# Patient Record
Sex: FEMALE | Race: WHITE | NOT HISPANIC OR LATINO | Employment: OTHER | URBAN - METROPOLITAN AREA
[De-identification: names, ages, dates, MRNs, and addresses within clinical notes are randomized per-mention and may not be internally consistent; named-entity substitution may affect disease eponyms.]

---

## 2017-08-21 ENCOUNTER — CONVERSION ENCOUNTER (OUTPATIENT)
Dept: GENERAL RADIOLOGY | Facility: HOSPITAL | Age: 67
End: 2017-08-21

## 2018-03-20 ENCOUNTER — OFFICE VISIT CONVERTED (OUTPATIENT)
Dept: FAMILY MEDICINE CLINIC | Facility: CLINIC | Age: 68
End: 2018-03-20
Attending: NURSE PRACTITIONER

## 2018-07-26 ENCOUNTER — OFFICE VISIT CONVERTED (OUTPATIENT)
Dept: FAMILY MEDICINE CLINIC | Facility: CLINIC | Age: 68
End: 2018-07-26
Attending: NURSE PRACTITIONER

## 2018-07-26 ENCOUNTER — CONVERSION ENCOUNTER (OUTPATIENT)
Dept: FAMILY MEDICINE CLINIC | Facility: CLINIC | Age: 68
End: 2018-07-26

## 2018-09-18 ENCOUNTER — CONVERSION ENCOUNTER (OUTPATIENT)
Dept: GENERAL RADIOLOGY | Facility: HOSPITAL | Age: 68
End: 2018-09-18

## 2019-03-15 ENCOUNTER — OFFICE VISIT CONVERTED (OUTPATIENT)
Dept: FAMILY MEDICINE CLINIC | Facility: CLINIC | Age: 69
End: 2019-03-15
Attending: NURSE PRACTITIONER

## 2019-03-15 ENCOUNTER — CONVERSION ENCOUNTER (OUTPATIENT)
Dept: FAMILY MEDICINE CLINIC | Facility: CLINIC | Age: 69
End: 2019-03-15

## 2019-03-27 ENCOUNTER — HOSPITAL ENCOUNTER (OUTPATIENT)
Dept: GENERAL RADIOLOGY | Facility: HOSPITAL | Age: 69
Discharge: HOME | End: 2019-03-27
Attending: NURSE PRACTITIONER

## 2019-04-16 ENCOUNTER — OFFICE VISIT CONVERTED (OUTPATIENT)
Dept: SURGERY | Facility: CLINIC | Age: 69
End: 2019-04-16
Attending: NURSE PRACTITIONER

## 2019-08-30 ENCOUNTER — OFFICE VISIT CONVERTED (OUTPATIENT)
Dept: FAMILY MEDICINE CLINIC | Facility: CLINIC | Age: 69
End: 2019-08-30
Attending: NURSE PRACTITIONER

## 2019-09-17 ENCOUNTER — HOSPITAL ENCOUNTER (OUTPATIENT)
Dept: LAB | Facility: HOSPITAL | Age: 69
Discharge: HOME | End: 2019-09-17
Attending: NURSE PRACTITIONER

## 2019-09-17 LAB
ALBUMIN SERPL-MCNC: 4.5 G/DL (ref 3.5–5)
ALBUMIN/GLOB SERPL: 1.7 {RATIO} (ref 1.4–2.6)
ALP SERPL-CCNC: 105 U/L (ref 43–160)
ALT SERPL-CCNC: 14 U/L (ref 10–40)
ANION GAP SERPL CALC-SCNC: 21 MMOL/L (ref 8–19)
AST SERPL-CCNC: 17 U/L (ref 15–50)
BASOPHILS # BLD AUTO: 0.04 10*3/UL (ref 0–0.2)
BASOPHILS NFR BLD AUTO: 0.6 % (ref 0–3)
BILIRUB SERPL-MCNC: 0.66 MG/DL (ref 0.2–1.3)
BUN SERPL-MCNC: 15 MG/DL (ref 5–25)
BUN/CREAT SERPL: 15 {RATIO} (ref 6–20)
CALCIUM SERPL-MCNC: 9.3 MG/DL (ref 8.7–10.4)
CHLORIDE SERPL-SCNC: 105 MMOL/L (ref 99–111)
CHOLEST SERPL-MCNC: 204 MG/DL (ref 107–200)
CHOLEST/HDLC SERPL: 3.6 {RATIO} (ref 3–6)
CONV ABS IMM GRAN: 0.02 10*3/UL (ref 0–0.2)
CONV CO2: 22 MMOL/L (ref 22–32)
CONV IMMATURE GRAN: 0.3 % (ref 0–1.8)
CONV TOTAL PROTEIN: 7.1 G/DL (ref 6.3–8.2)
CREAT UR-MCNC: 0.99 MG/DL (ref 0.5–0.9)
DEPRECATED RDW RBC AUTO: 44.3 FL (ref 36.4–46.3)
EOSINOPHIL # BLD AUTO: 0.15 10*3/UL (ref 0–0.7)
EOSINOPHIL # BLD AUTO: 2.1 % (ref 0–7)
ERYTHROCYTE [DISTWIDTH] IN BLOOD BY AUTOMATED COUNT: 12.3 % (ref 11.7–14.4)
GFR SERPLBLD BASED ON 1.73 SQ M-ARVRAT: 57 ML/MIN/{1.73_M2}
GLOBULIN UR ELPH-MCNC: 2.6 G/DL (ref 2–3.5)
GLUCOSE SERPL-MCNC: 121 MG/DL (ref 65–99)
HCT VFR BLD AUTO: 43.4 % (ref 37–47)
HDLC SERPL-MCNC: 57 MG/DL (ref 40–60)
HGB BLD-MCNC: 14.3 G/DL (ref 12–16)
LDLC SERPL CALC-MCNC: 114 MG/DL (ref 70–100)
LYMPHOCYTES # BLD AUTO: 2.59 10*3/UL (ref 1–5)
LYMPHOCYTES NFR BLD AUTO: 36.3 % (ref 20–45)
MCH RBC QN AUTO: 31.8 PG (ref 27–31)
MCHC RBC AUTO-ENTMCNC: 32.9 G/DL (ref 33–37)
MCV RBC AUTO: 96.7 FL (ref 81–99)
MONOCYTES # BLD AUTO: 0.56 10*3/UL (ref 0.2–1.2)
MONOCYTES NFR BLD AUTO: 7.9 % (ref 3–10)
NEUTROPHILS # BLD AUTO: 3.77 10*3/UL (ref 2–8)
NEUTROPHILS NFR BLD AUTO: 52.8 % (ref 30–85)
NRBC CBCN: 0 % (ref 0–0.7)
OSMOLALITY SERPL CALC.SUM OF ELEC: 300 MOSM/KG (ref 273–304)
PLATELET # BLD AUTO: 241 10*3/UL (ref 130–400)
PMV BLD AUTO: 11.7 FL (ref 9.4–12.3)
POTASSIUM SERPL-SCNC: 4.1 MMOL/L (ref 3.5–5.3)
RBC # BLD AUTO: 4.49 10*6/UL (ref 4.2–5.4)
SODIUM SERPL-SCNC: 144 MMOL/L (ref 135–147)
T4 FREE SERPL-MCNC: 1.1 NG/DL (ref 0.9–1.8)
TRIGL SERPL-MCNC: 164 MG/DL (ref 40–150)
TSH SERPL-ACNC: 2.91 M[IU]/L (ref 0.27–4.2)
VLDLC SERPL-MCNC: 33 MG/DL (ref 5–37)
WBC # BLD AUTO: 7.13 10*3/UL (ref 4.8–10.8)

## 2019-10-02 ENCOUNTER — HOSPITAL ENCOUNTER (OUTPATIENT)
Dept: GENERAL RADIOLOGY | Facility: HOSPITAL | Age: 69
Discharge: HOME | End: 2019-10-02
Attending: NURSE PRACTITIONER

## 2019-10-04 ENCOUNTER — OFFICE VISIT CONVERTED (OUTPATIENT)
Dept: FAMILY MEDICINE CLINIC | Facility: CLINIC | Age: 69
End: 2019-10-04
Attending: NURSE PRACTITIONER

## 2020-05-06 ENCOUNTER — OFFICE VISIT CONVERTED (OUTPATIENT)
Dept: FAMILY MEDICINE CLINIC | Facility: CLINIC | Age: 70
End: 2020-05-06
Attending: NURSE PRACTITIONER

## 2020-09-22 ENCOUNTER — HOSPITAL ENCOUNTER (OUTPATIENT)
Dept: LAB | Facility: HOSPITAL | Age: 70
Discharge: HOME | End: 2020-09-22
Attending: NURSE PRACTITIONER

## 2020-09-22 LAB
25(OH)D3 SERPL-MCNC: 43.5 NG/ML (ref 30–100)
ALBUMIN SERPL-MCNC: 4.3 G/DL (ref 3.5–5)
ALBUMIN/GLOB SERPL: 1.7 {RATIO} (ref 1.4–2.6)
ALP SERPL-CCNC: 100 U/L (ref 43–160)
ALT SERPL-CCNC: 16 U/L (ref 10–40)
AMPHETAMINES UR QL SCN: NEGATIVE
ANION GAP SERPL CALC-SCNC: 18 MMOL/L (ref 8–19)
AST SERPL-CCNC: 19 U/L (ref 15–50)
BARBITURATES UR QL SCN: NEGATIVE
BASOPHILS # BLD AUTO: 0.08 10*3/UL (ref 0–0.2)
BASOPHILS NFR BLD AUTO: 1.1 % (ref 0–3)
BENZODIAZ UR QL SCN: NEGATIVE
BILIRUB SERPL-MCNC: 0.55 MG/DL (ref 0.2–1.3)
BUN SERPL-MCNC: 16 MG/DL (ref 5–25)
BUN/CREAT SERPL: 15 {RATIO} (ref 6–20)
CALCIUM SERPL-MCNC: 9.4 MG/DL (ref 8.7–10.4)
CHLORIDE SERPL-SCNC: 102 MMOL/L (ref 99–111)
CHOLEST SERPL-MCNC: 204 MG/DL (ref 107–200)
CHOLEST/HDLC SERPL: 4.3 {RATIO} (ref 3–6)
CONV ABS IMM GRAN: 0.02 10*3/UL (ref 0–0.2)
CONV CO2: 25 MMOL/L (ref 22–32)
CONV COCAINE, UR: NEGATIVE
CONV IMMATURE GRAN: 0.3 % (ref 0–1.8)
CONV TOTAL PROTEIN: 6.9 G/DL (ref 6.3–8.2)
CREAT UR-MCNC: 1.06 MG/DL (ref 0.5–0.9)
DEPRECATED RDW RBC AUTO: 44.6 FL (ref 36.4–46.3)
EOSINOPHIL # BLD AUTO: 0.18 10*3/UL (ref 0–0.7)
EOSINOPHIL # BLD AUTO: 2.5 % (ref 0–7)
ERYTHROCYTE [DISTWIDTH] IN BLOOD BY AUTOMATED COUNT: 12.1 % (ref 11.7–14.4)
FOLATE SERPL-MCNC: 17 NG/ML (ref 4.8–20)
GFR SERPLBLD BASED ON 1.73 SQ M-ARVRAT: 52 ML/MIN/{1.73_M2}
GLOBULIN UR ELPH-MCNC: 2.6 G/DL (ref 2–3.5)
GLUCOSE SERPL-MCNC: 120 MG/DL (ref 65–99)
HCT VFR BLD AUTO: 46.3 % (ref 37–47)
HDLC SERPL-MCNC: 48 MG/DL (ref 40–60)
HGB BLD-MCNC: 14.6 G/DL (ref 12–16)
LDLC SERPL CALC-MCNC: 129 MG/DL (ref 70–100)
LYMPHOCYTES # BLD AUTO: 2.87 10*3/UL (ref 1–5)
LYMPHOCYTES NFR BLD AUTO: 40.1 % (ref 20–45)
MCH RBC QN AUTO: 31.5 PG (ref 27–31)
MCHC RBC AUTO-ENTMCNC: 31.5 G/DL (ref 33–37)
MCV RBC AUTO: 100 FL (ref 81–99)
METHADONE UR QL SCN: NEGATIVE
MONOCYTES # BLD AUTO: 0.61 10*3/UL (ref 0.2–1.2)
MONOCYTES NFR BLD AUTO: 8.5 % (ref 3–10)
NEUTROPHILS # BLD AUTO: 3.39 10*3/UL (ref 2–8)
NEUTROPHILS NFR BLD AUTO: 47.5 % (ref 30–85)
NRBC CBCN: 0 % (ref 0–0.7)
OPIATES TESTED UR SCN: NEGATIVE
OSMOLALITY SERPL CALC.SUM OF ELEC: 292 MOSM/KG (ref 273–304)
OXYCODONE UR QL SCN: NEGATIVE
PCP UR QL: NEGATIVE
PLATELET # BLD AUTO: 249 10*3/UL (ref 130–400)
PMV BLD AUTO: 12.1 FL (ref 9.4–12.3)
POTASSIUM SERPL-SCNC: 4.6 MMOL/L (ref 3.5–5.3)
RBC # BLD AUTO: 4.63 10*6/UL (ref 4.2–5.4)
SODIUM SERPL-SCNC: 140 MMOL/L (ref 135–147)
T4 FREE SERPL-MCNC: 1.1 NG/DL (ref 0.9–1.8)
THC SERPLBLD CFM-MCNC: NEGATIVE NG/ML
TRIGL SERPL-MCNC: 133 MG/DL (ref 40–150)
TSH SERPL-ACNC: 4.09 M[IU]/L (ref 0.27–4.2)
VIT B12 SERPL-MCNC: 301 PG/ML (ref 211–911)
VLDLC SERPL-MCNC: 27 MG/DL (ref 5–37)
WBC # BLD AUTO: 7.15 10*3/UL (ref 4.8–10.8)

## 2020-10-05 ENCOUNTER — HOSPITAL ENCOUNTER (OUTPATIENT)
Dept: GENERAL RADIOLOGY | Facility: HOSPITAL | Age: 70
Discharge: HOME | End: 2020-10-05
Attending: NURSE PRACTITIONER

## 2021-01-19 ENCOUNTER — HOSPITAL ENCOUNTER (OUTPATIENT)
Dept: OTHER | Facility: HOSPITAL | Age: 71
Discharge: HOME | End: 2021-01-19
Attending: INTERNAL MEDICINE

## 2021-02-19 ENCOUNTER — HOSPITAL ENCOUNTER (OUTPATIENT)
Dept: VACCINE CLINIC | Facility: HOSPITAL | Age: 71
Discharge: HOME | End: 2021-02-19
Attending: INTERNAL MEDICINE

## 2021-05-13 NOTE — PROGRESS NOTES
Progress Note      Patient Name: Anabel Cali   Patient ID: [id number]   Sex: Female   YOB: 1950    Primary Care Provider: Giovanna DODD   Referring Provider: Giovanna DODD    Visit Date: May 6, 2020    Provider: YOUSIF Herrera   Location: [facility]   Location Address: [address] [place] [address], [address]  [place], [place]  [id number]   Location Phone: [phone]          Chief Complaint  · F/u medications      History Of Present Illness  Video Conferencing Visit  Anabel Cali is a 70 year old /White female who is presenting for evaluation via video conferencing. Verbal consent obtained before beginning visit.   The following staff were present during this visit: Sunita Santos MA   Anabel Cali is a 70 year old /White female who presents for evaluation and treatment of:      the patient presents today on facetime she has h/o anxiety and tremor and she has h/o htn hyperlipidemia, osteoporosis she refuses to treat she tried and had bone pain we have discussed taking D today about 5000iu daily       Past Medical History  Disease Name Date Onset Notes   Anxiety --  --    Bone Density Screening 3/15/19 --    High blood pressure --  --    High cholesterol --  --    Screening Mammogram 9/25/19 --          Past Surgical History  Procedure Name Date Notes   *No Past Surgical History --  --    Colonoscopy 8/7/2009 Due 8/2019, patient refused f/u colonoscopy- 8/30/19         Medication List  Name Date Started Instructions   Ativan 0.5 mg oral tablet 05/06/2020 one tablets BID prn anxiety   clobetasol 0.05 % topical cream 08/30/2019 apply a thin layer to the affected area(s) by topical route 2 times per day for 14 days   famotidine 20 mg oral tablet 05/06/2020 take 1 tablet by oral route 2 times a day for 90 days   metoprolol tartrate 25 mg oral tablet 01/24/2020 TAKE ONE TABLET BY MOUTH TWICE A DAY   Pravachol 40 mg oral tablet 01/29/2020 TAKE ONE TABLET BY MOUTH  EVERY NIGHT AT BEDTIME         Allergy List  Allergen Name Date Reaction Notes   NO KNOWN DRUG ALLERGIES --  --  --        Allergies Reconciled  Family Medical History  Disease Name Relative/Age Notes   Diabetes, unspecified type Brother/  Mother/   --    Hypertension Mother/   --          Reproductive History  Menstrual   Age Menarche: 12   Pregnancy Summary   Total Pregnancies: 0 Full Term: 0 Premature: 0   Ab Induced: 0 Ab Spontaneous: 0 Ectopics: 0   Multiples: 0 Livin         Social History  Finding Status Start/Stop Quantity Notes   Alcohol Current some day --/-- socially --     --  --/-- --  --    No known infection risk --  --/-- --  --    Not sexually active --  --/-- --  --    Tobacco Former --/49 1PPD 2018 - former 2016 - former          Immunizations  NameDate Admin Mfg Trade Name Lot Number Route Inj VIS Given VIS Publication   Hepatitis [id number] SKB HAVRIX-ADULT  NE NE 2018   Comments: pt states she recieved at [facility]/[employer] pharmacy   Mprfrikvl41/2019 SKB Fluzone Quadrivalent  NE NE 10/04/2019    Comments: [facility]   Dqkojwfci76/2018 SKB Fluzone Quadrivalent  NE NE 2019    Comments:    Fhwijsagq73/2016 NE Not Entered  NE NE 2016    Comments: recieved at [facility] pharmacy per patient.   Oexzzjqdh79/2014 SKB Fluarix, quadrivalent, preservative free 2A2KX NE NE 2014   Comments: per pt.   Wdkgcqesr40/2013 SKB Fluarix-PF > 3 Years [id number] IM NE 2014   Comments: per pt   Gqxwggheqdss53/07/2015 UNK PREVNAR 13 [id number] ID RA 2015   Comments: Tolerated well   Sbtmxhfhd0092/14/2017 MSD PNEUMOVAX 23 [id number] IM RD 2017   Comments: Patient tolerated well.   Ypfmguvo74/27/2018 NE Not Entered  NE NE 2018    Comments: pt states she recieved shingrix vaccine at [facility]/ pharmacy   [id number] SKB BOOSTRIX 34EB5 IM  2014   Comments: tolerated well          Review of Systems  · Constitutional  o Denies  o : fever, weight gain, weight loss, malaise/fatigue  · Eyes  o Denies  o : diplopia, recent changes, blurred vision,  · HENT  o Denies  o : sore throat, hearing changes, tinnitus, nose bleeding, sinus pain, nasal discharge, ear pain  · Breasts  o Denies  o : lumps, tenderness, swelling, nipple discharge  · Cardiovascular  o Denies  o : palpitation, chest pain, claudication, pedal edema  · Respiratory  o Denies  o : shortness of breath, JONES, cough  · Gastrointestinal  o Denies  o : nausea, vomiting, reflux, diarrhea, constipation, abdominal pain, blood in stools  · Genitourinary  o Admits  o : osteoporosis  o Denies  o : frequency, urgency, dysuria, incontinence, nocturia,   · Neurologic  o Admits  o : h/o tremor  o Denies  o : unsteady gait, weakness, dizziness, H/A  · Musculoskeletal  o Denies  o : myalgias, joint pain, joint swelling  · Endocrine  o Denies  o : heat intolerance, cold intolerance, polyuria, polydipsia  · Psychiatric  o Admits  o : h/o anxiety  o Denies  o : suicidal ideation, homicidal ideation, mood changes, hallucinations, memory      Physical Examination  · Constitutional  o Appearance  o : well-nourished, well developed, alert, in no acute distress  · Head and Face  o Head  o :   § Inspection  § : atraumatic, normocephalic  o Face  o :   § Inspection  § : no facial lesions  · Ears, Nose, Mouth and Throat  o Ears  o :   § External Ears  § : appearance within normal limits, no lesions present  o Nose  o :   § External Nose  § : normal stucture noted.  o Oral Cavity  o :   § Lips  § : lip appearance normal  · Neck  o Inspection/Palpation  o : normal appearance, no masses or tenderness, trachea midline, no deformities  o Range of Motion  o : range of motion within normal limits  · Respiratory  o Respiratory Effort  o : breathing unlabored  · Skin and Subcutaneous Tissue  o General Inspection  o : no rashes or lesions present, no areas of  discoloration  · Neurologic  o Mental Status Examination  o :   § Orientation  § : grossly oriented to person, place and time  o Cranial Nerves  o : cranial nerves intact and symmetric throughout  · Psychiatric  o Mood and Affect  o : mood normal, affect appropriate, denies any SI/HI              Assessment  · Osteoporosis     733.00/M81.0  · Other long term (current) drug therapy     V58.69/Z79.899  · Anxiety     300.02/F41.1  · High blood pressure     401.9/I10  · High cholesterol     272.0/E78.0  · Tremor     781.0/R25.1      Plan  · Orders  o BRIAN Report (KASPR) - - 05/06/2020  o ACO-39: Current medications updated and reviewed () - - 05/06/2020  · Medications  o famotidine 20 mg oral tablet   SIG: take 1 tablet by oral route 2 times a day for 90 days   DISP: (180) tablets with 3 refills  Adjusted on 05/06/2020     o Ativan 0.5 mg oral tablet   SIG: one tablets BID prn anxiety   DISP: (60) tablet with 0 refills  Refilled on 05/06/2020     o Medications have been Reconciled  o Transition of Care or Provider Policy  · Instructions  o Handouts were given to patient:   o Patient is taking medications as prescribed and doing well.   o Take all medications as prescribed/directed.  o Patient was educated/instructed on their diagnosis, treatment and medications prior to discharge from the clinic today.  o Patient instructed to seek medical attention urgently for new or worsening symptoms.  o Call the office with any concerns or questions.  o Risks, benefits, and alternatives were discussed with the patient. The patient is aware of risks associated with: medications  o Chronic conditions reviewed and taken into consideration for today's treatment plan.  · Disposition  o Call or Return if symptoms worsen or persist.  o follow up 6 months  o follow up prn or as needed  o Care Transition            Electronically Signed by: YOUSIF Herrera -Author on May 9, 2020 11:04:32 PM

## 2021-05-13 NOTE — PROGRESS NOTES
Progress Note      Patient Name: Anabel Cali   Patient ID: [id number]   Sex: Female   YOB: 1950    Primary Care Provider: Giovanna DODD   Referring Provider: Giovanna DODD    Visit Date: May 6, 2020    Provider: YOUSIF Herrera   Location: [facility]   Location Address: [address] [place] [address], [address]  [place], [place]  [id number]   Location Phone: [phone]          Chief Complaint  · Annual Wellness Exam      History Of Present Illness  The patient is a 70 year old /White female who is presenting for evaluation via video conferencing for her Annual Wellness Visit. Verbal consent obtained before beginning visit.   The following staff were present during the visit: Sunita Santos MA   Her Primary Care Provider is Giovanna DODD. Her comprehensive Care Team list, including suppliers, has been updated on the Facesheet. Her medical/family history, height, weight, BMI, and blood pressure have been reviewed and are in the chart. The Health Risk Assessment has been completed and scanned in the chart.   Medications are listed in the medication list.   The active problem list includes: Anxiety, High blood pressure, and High cholesterol   The patient does not have a history of substance use.   Patient reports her diet is adequate.   The Mini-Cog has been administered and is scanned in chart. The results are negative. Her cognitive function is without limitation.   A hearing loss screen was completed today and the result is negative.   Patient does not have any risk factors for depression. Patient completed the PHQ-9 today and it has been scanned in the chart. The total score is 0.   The Get Up and Go screen was administered today and the result is negative.   The Orantes Index of [place]ell in ADLs indicated full function (score of 6).   A Falls Risk Assessment has been completed, including a review of home fall hazards and medication review.   Overall, the patient's  functional ability is noted by this provider to be within normal limits. Her level of safety is noted to be within normal limits. Her balance/gait is within normal limits. There have been no falls in the past year. Patient-specific home safety recommendations have been reviewed and a copy has been given to patient.   She denies issues with leaking urine.   There are no additional risk factors identified.   Living Will/Advanced Directive has not previously been completed.   Personalized health advice was given to the patient and a written health screening schedule was established; see Plan for details.       Past Medical History  Disease Name Date Onset Notes   Anxiety --  --    Bone Density Screening 3/15/19 --    High blood pressure --  --    High cholesterol --  --    Screening Mammogram 9/25/19 --          Past Surgical History  Procedure Name Date Notes   *No Past Surgical History --  --    Colonoscopy 8/7/2009 Due 8/2019, patient refused f/u colonoscopy- 8/30/19         Medication List  Name Date Started Instructions   Ativan 0.5 mg oral tablet 05/06/2020 one tablets BID prn anxiety   clobetasol 0.05 % topical cream 08/30/2019 apply a thin layer to the affected area(s) by topical route 2 times per day for 14 days   famotidine 20 mg oral tablet 05/06/2020 take 1 tablet by oral route 2 times a day for 90 days   metoprolol tartrate 25 mg oral tablet 01/24/2020 TAKE ONE TABLET BY MOUTH TWICE A DAY   Pravachol 40 mg oral tablet 01/29/2020 TAKE ONE TABLET BY MOUTH EVERY NIGHT AT BEDTIME         Allergy List  Allergen Name Date Reaction Notes   NO KNOWN DRUG ALLERGIES --  --  --        Allergies Reconciled  Family Medical History  Disease Name Relative/Age Notes   Diabetes, unspecified type Brother/  Mother/   --    Hypertension Mother/   --          Reproductive History  Menstrual   Age Menarche: 12   Pregnancy Summary   Total Pregnancies: 0 Full Term: 0 Premature: 0   Ab Induced: 0 Ab Spontaneous: 0 Ectopics: 0    Multiples: 0 Livin         Social History  Finding Status Start/Stop Quantity Notes   Alcohol Current some day --/-- socially --     --  --/-- --  --    No known infection risk --  --/-- --  --    Not sexually active --  --/-- --  --    Tobacco Former --/49 1PPD 2018 - former 2016 - former          Immunizations  NameDate Admin Mfg Trade Name Lot Number Route Inj VIS Given VIS Publication   Hepatitis [id number] SKB HAVRIX-ADULT  NE NE 2018   Comments: [facility]   Sucwgooqx45/2019 SKB Fluzone Quadrivalent  NE NE 10/04/2019    Comments: [facility]   Wepuyiwnv61/2018 SKB Fluzone Quadrivalent  NE NE 2019    Comments:    Odbuepbtc82/2016 NE Not Entered  NE NE 2016    Comments: recieved at [facility] pharmacy per patient.   Xpeiituol18/2014 SKB Fluarix, quadrivalent, preservative free 2A2KX NE NE 2014   Comments: per pt.   Ibrynrarl82/2013 SKB Fluarix-PF > 3 Years 752B7 IM NE 2014   Comments: per pt   Jaqpiyxaoypw33/07/2015 UNK PREVNAR 13 [id number] ID RA 2015   Comments: Tolerated well   Smeyaijdr5203/14/2017 MSD PNEUMOVAX 23 [id number] IM RD 2017   Comments: Patient tolerated well.   Rggtdbog07/27/2018 NE Not Entered  NE NE 2018    Comments: pt states she recieved shingrix vaccine at [facility]/ pharmacy   [id number] SKB BOOSTRIX 34EB5 IM RA 2014   Comments: tolerated well             Assessment  · Encounter for Medicare annual wellness exam     V70.0/Z00.00  · Screening for depression     V79.0/Z13.89  · Screening for alcoholism     V79.1/Z13.89  · Screening for colon cancer     V76.51/Z12.11  · Visit for screening mammogram     V76.12/Z12.31      Plan  · Orders  o Falls Risk Assessment Completed (3288F) - V70.0/Z00.00 - 2020  o Brief hearing screening (written) [facility] () - V70.0/Z. - 2020  o Annual Wellness  Visit-includes a Personalized Prevention Plan of Service (PPS), SUBSEQUENT VISIT (Medicare) () - V70.0/Z00.00 - 05/06/2020  o ACO-13: Fall Risk Screening with no falls in past year or only one fall without injury in the past year (1101F) - V70.0/Z00.00 - 05/06/2020  o Presence or absence of urinary incontinence assessed (CHANA) (1090F) - V70.0/Z00.00 - 05/06/2020  o Annual depression screening using the PHQ-9 tool, 15 minutes (, 06897) - V79.0/Z13.89 - 05/06/2020  o ACO-18: Negative screen for clinical depression using a standardized tool () - V79.0/Z13.89 - 05/06/2020  o Annual alcohol screening using the AUDIT-C tool, 15 minutes [facility] (86273, ) - V79.1/Z13.89 - 05/06/2020  o Negative alcohol screening () - V79.1/Z13.89 - 05/06/2020  o COLONOSCOPY REFERRAL (COLON) - V76.51/Z12.11 - 05/06/2020  o Screening Mammography; Bilateral 3D (14258, , 76271) - V76.12/Z12.31 - 05/06/2020  o ACO-39: Current medications updated and reviewed () - - 05/06/2020  o ACO-20: Screening Mammography documented and reviewed () - - 05/06/2020  o Influenza immunization was not ordered or administered for reasons documented by clinician () - - 05/06/2020  o ACO-15: Pneumococcal Vaccine Administered or Previously Received (4040F) - - 05/06/2020  o ACO - Pt declines to or was not able to provide an Advance Care Plan or name a Surrogate Decision Maker (1124F) - - 05/06/2020  · Medications  o Medications have been Reconciled  o Transition of Care or Provider Policy  · Instructions  o Health Risk Assessment has been reviewed with the patient.  o Written health screening schedule for next 5-10 years was established with patient; information scanned in chart and given/mailed to patient.  o Fall prevention methods discussed and a copy of recommendations given/mailed to patient.  o Today's PHQ-9 score is: _0__  o Depression Screen completed and scanned into the EMR under the designated folder within the  patient's documents.  o Audit-C Questionnaire completed and scanned into the EMR under the designated folder within the patient's documents.  o Audit-C score of 0-4 - Negative Screen - Brief Discussion  · Disposition  o Call or Return if symptoms worsen or persist.  o follow up 1 year  o follow up prn or as needed  o Care Transition            Electronically Signed by: YOUSIF Herrera -Author on May 9, 2020 10:59:45 PM

## 2021-05-15 VITALS — HEIGHT: 66 IN | WEIGHT: 143 LBS | BODY MASS INDEX: 22.98 KG/M2 | HEART RATE: 57 BPM

## 2021-05-15 VITALS
WEIGHT: 145 LBS | HEIGHT: 63 IN | OXYGEN SATURATION: 97 % | HEART RATE: 64 BPM | BODY MASS INDEX: 25.69 KG/M2 | SYSTOLIC BLOOD PRESSURE: 138 MMHG | DIASTOLIC BLOOD PRESSURE: 80 MMHG

## 2021-05-15 VITALS
DIASTOLIC BLOOD PRESSURE: 83 MMHG | SYSTOLIC BLOOD PRESSURE: 137 MMHG | HEART RATE: 60 BPM | HEIGHT: 63 IN | BODY MASS INDEX: 25.58 KG/M2 | WEIGHT: 144.37 LBS

## 2021-05-16 VITALS
DIASTOLIC BLOOD PRESSURE: 78 MMHG | BODY MASS INDEX: 24.98 KG/M2 | HEART RATE: 68 BPM | SYSTOLIC BLOOD PRESSURE: 147 MMHG | HEIGHT: 63 IN | RESPIRATION RATE: 18 BRPM | OXYGEN SATURATION: 95 % | WEIGHT: 141 LBS

## 2021-05-16 VITALS
BODY MASS INDEX: 25.03 KG/M2 | SYSTOLIC BLOOD PRESSURE: 140 MMHG | HEART RATE: 51 BPM | DIASTOLIC BLOOD PRESSURE: 86 MMHG | HEIGHT: 63 IN | SYSTOLIC BLOOD PRESSURE: 130 MMHG | WEIGHT: 141.25 LBS | DIASTOLIC BLOOD PRESSURE: 74 MMHG

## 2021-05-16 VITALS
SYSTOLIC BLOOD PRESSURE: 120 MMHG | HEIGHT: 63 IN | WEIGHT: 145 LBS | HEART RATE: 71 BPM | OXYGEN SATURATION: 97 % | DIASTOLIC BLOOD PRESSURE: 80 MMHG | BODY MASS INDEX: 25.69 KG/M2

## 2021-09-02 ENCOUNTER — TRANSCRIBE ORDERS (OUTPATIENT)
Dept: ADMINISTRATIVE | Facility: HOSPITAL | Age: 71
End: 2021-09-02

## 2021-09-02 DIAGNOSIS — Z12.31 ENCOUNTER FOR SCREENING MAMMOGRAM FOR MALIGNANT NEOPLASM OF BREAST: Primary | ICD-10-CM

## 2021-11-23 ENCOUNTER — APPOINTMENT (OUTPATIENT)
Dept: MAMMOGRAPHY | Facility: HOSPITAL | Age: 71
End: 2021-11-23

## 2022-01-07 ENCOUNTER — APPOINTMENT (OUTPATIENT)
Dept: MAMMOGRAPHY | Facility: HOSPITAL | Age: 72
End: 2022-01-07

## 2022-01-13 ENCOUNTER — HOSPITAL ENCOUNTER (OUTPATIENT)
Dept: MAMMOGRAPHY | Facility: HOSPITAL | Age: 72
Discharge: HOME | End: 2022-01-13
Admitting: NURSE PRACTITIONER

## 2022-01-13 DIAGNOSIS — Z12.31 ENCOUNTER FOR SCREENING MAMMOGRAM FOR MALIGNANT NEOPLASM OF BREAST: ICD-10-CM

## 2022-01-13 PROCEDURE — 77067 SCR MAMMO BI INCL CAD: CPT

## 2022-01-13 PROCEDURE — 77063 BREAST TOMOSYNTHESIS BI: CPT

## 2022-09-02 ENCOUNTER — TRANSCRIBE ORDERS (OUTPATIENT)
Dept: ADMINISTRATIVE | Facility: HOSPITAL | Age: 72
End: 2022-09-02

## 2022-09-02 DIAGNOSIS — Z12.31 VISIT FOR SCREENING MAMMOGRAM: Primary | ICD-10-CM

## 2023-01-16 ENCOUNTER — APPOINTMENT (OUTPATIENT)
Dept: MAMMOGRAPHY | Facility: HOSPITAL | Age: 73
End: 2023-01-16
Payer: MEDICARE

## 2023-03-30 ENCOUNTER — HOSPITAL ENCOUNTER (OUTPATIENT)
Dept: MAMMOGRAPHY | Facility: HOSPITAL | Age: 73
Discharge: HOME | End: 2023-03-30
Admitting: NURSE PRACTITIONER
Payer: MEDICARE

## 2023-03-30 DIAGNOSIS — Z12.31 VISIT FOR SCREENING MAMMOGRAM: ICD-10-CM

## 2023-03-30 PROCEDURE — 77063 BREAST TOMOSYNTHESIS BI: CPT

## 2023-03-30 PROCEDURE — 77067 SCR MAMMO BI INCL CAD: CPT

## 2023-04-04 ENCOUNTER — TRANSCRIBE ORDERS (OUTPATIENT)
Dept: ADMINISTRATIVE | Facility: HOSPITAL | Age: 73
End: 2023-04-04
Payer: MEDICARE

## 2023-04-04 DIAGNOSIS — R92.8 ABNORMAL MAMMOGRAM: Primary | ICD-10-CM

## 2023-04-13 ENCOUNTER — HOSPITAL ENCOUNTER (OUTPATIENT)
Dept: MAMMOGRAPHY | Facility: HOSPITAL | Age: 73
Discharge: HOME | End: 2023-04-13
Payer: MEDICARE

## 2023-04-13 ENCOUNTER — HOSPITAL ENCOUNTER (OUTPATIENT)
Dept: ULTRASOUND IMAGING | Facility: HOSPITAL | Age: 73
Discharge: HOME | End: 2023-04-13
Payer: MEDICARE

## 2023-04-13 DIAGNOSIS — R92.8 ABNORMAL MAMMOGRAM: ICD-10-CM

## 2023-04-13 PROCEDURE — G0279 TOMOSYNTHESIS, MAMMO: HCPCS

## 2023-04-13 PROCEDURE — 77065 DX MAMMO INCL CAD UNI: CPT

## 2023-09-07 ENCOUNTER — TRANSCRIBE ORDERS (OUTPATIENT)
Dept: ADMINISTRATIVE | Facility: HOSPITAL | Age: 73
End: 2023-09-07
Payer: MEDICARE

## 2023-09-07 DIAGNOSIS — Z12.31 VISIT FOR SCREENING MAMMOGRAM: Primary | ICD-10-CM

## 2024-04-01 ENCOUNTER — HOSPITAL ENCOUNTER (OUTPATIENT)
Dept: MAMMOGRAPHY | Facility: HOSPITAL | Age: 74
Discharge: HOME | End: 2024-04-01
Admitting: NURSE PRACTITIONER
Payer: MEDICARE

## 2024-04-01 DIAGNOSIS — Z12.31 VISIT FOR SCREENING MAMMOGRAM: ICD-10-CM

## 2024-04-01 PROCEDURE — 77063 BREAST TOMOSYNTHESIS BI: CPT

## 2024-04-01 PROCEDURE — 77067 SCR MAMMO BI INCL CAD: CPT

## 2024-08-23 ENCOUNTER — OFFICE VISIT (OUTPATIENT)
Dept: GASTROENTEROLOGY | Facility: CLINIC | Age: 74
End: 2024-08-23
Payer: MEDICARE

## 2024-08-23 VITALS
OXYGEN SATURATION: 100 % | BODY MASS INDEX: 23.21 KG/M2 | SYSTOLIC BLOOD PRESSURE: 147 MMHG | HEIGHT: 63 IN | DIASTOLIC BLOOD PRESSURE: 68 MMHG | HEART RATE: 53 BPM | WEIGHT: 131 LBS

## 2024-08-23 DIAGNOSIS — R15.1 FECAL SMEARING: Primary | ICD-10-CM

## 2024-08-23 DIAGNOSIS — K64.9 HEMORRHOIDS, UNSPECIFIED HEMORRHOID TYPE: ICD-10-CM

## 2024-08-23 RX ORDER — IBUPROFEN 200 MG
CAPSULE ORAL
COMMUNITY

## 2024-08-23 RX ORDER — EZETIMIBE 10 MG/1
TABLET ORAL
COMMUNITY
Start: 2024-08-22

## 2024-08-23 RX ORDER — ASPIRIN 81 MG/1
TABLET ORAL
COMMUNITY

## 2024-08-23 RX ORDER — HYDROCORTISONE ACETATE 25 MG/1
24 SUPPOSITORY RECTAL 2 TIMES DAILY
Qty: 14 SUPPOSITORY | Refills: 1 | Status: SHIPPED | OUTPATIENT
Start: 2024-08-23

## 2024-08-23 RX ORDER — FAMOTIDINE 20 MG/1
TABLET, FILM COATED ORAL
COMMUNITY
Start: 2024-07-06

## 2024-08-23 RX ORDER — LORAZEPAM 0.5 MG/1
TABLET ORAL
COMMUNITY
Start: 2024-06-29

## 2024-08-23 RX ORDER — PRAVASTATIN SODIUM 20 MG
TABLET ORAL
COMMUNITY
Start: 2024-06-11

## 2024-08-23 NOTE — PROGRESS NOTES
Chief Complaint  Hemorrhoids (Mucus ), Black or Bloody Stool, and Constipation    Anabel Cali is a 74 y.o. female who presents to [facility]STR[facility]- [facility] on referral from YOUSIF Ojeda for a gastroenterology evaluation of constipation and blood in stool.      History of Present Illness  New patient presents to the office for mucus in stool, fecal leakage, and blood in stool. She has been having mucus discharge from her rectum about 1 year ago. She can have more constipation, can skip a few days without a bowel movement. She tried to eat a high fiber supplement. She has had intermittent blood in stool. Denies melena and unintentional weight loss. She has lost some weight but she has been increasing her activity. Heartburn is controlled with Pepcid 20mg at night. Denies nausea, vomiting, epigastric pain, and dysphagia.      History reviewed. No pertinent past medical history.    Past Surgical History:   Procedure Laterality Date    COLONOSCOPY           Current Outpatient Medications:     Cholecalciferol 25 MCG (1000 UT) capsule, , Disp: , Rfl:     ezetimibe (ZETIA) 10 MG tablet, , Disp: , Rfl:     famotidine (PEPCID) 20 MG tablet, , Disp: , Rfl:     LORazepam (ATIVAN) 0.5 MG tablet, , Disp: , Rfl:     metoprolol tartrate (LOPRESSOR) 25 MG tablet, , Disp: , Rfl:     pravastatin (PRAVACHOL) 20 MG tablet, , Disp: , Rfl:     aspirin 81 MG EC tablet, aspirin 81 mg oral tablet,delayed release (DR/EC) take 1 tablet (81 mg) by oral route once daily   Suspended (Patient not taking: Reported on 8/23/2024), Disp: , Rfl:     calcium carbonate (OS-GROVER) 1250 (500 Ca) MG tablet, Calcium 500 500 mg calcium (1,250 mg) oral tablet take 1 tablet by oral route daily   Suspended (Patient not taking: Reported on 8/23/2024), Disp: , Rfl:     hydrocortisone (ANUSOL-HC) 25 MG suppository, Insert 1 suppository into the rectum 2 (Two) Times a Day., Disp: 14 suppository, Rfl: 1     No Known  "Allergies    Family History   Problem Relation Age of Onset    Colon cancer Neg Hx         Social History     Social History Narrative    Not on file       Immunization:  Immunization History   Administered Date(s) Administered    COVID-19 (MODERNA) 1st,2nd,3rd Dose Monovalent 01/19/2021, 02/19/2021, 10/23/2021    COVID-19 (MODERNA) BIVALENT 12+YRS 12/28/2022    COVID-19 (MODERNA) Monovalent Original Booster 04/19/2022    COVID-19 F23 (MODERNA) 12YRS+ (SPIKEVAX) 09/15/2023        Objective     Vital Signs:   /68 (BP Location: Left arm, Patient Position: Sitting, Cuff Size: Adult)   Pulse 53   Ht 160 cm (62.99\")   Wt 59.4 kg (131 lb)   SpO2 100%   BMI 23.21 kg/m²       Physical Exam  Constitutional:       Appearance: Normal appearance. She is normal weight.   HENT:      Head: Normocephalic and atraumatic.      Nose: Nose normal.   Pulmonary:      Effort: Pulmonary effort is normal.   Skin:     General: Skin is warm and dry.   Neurological:      Mental Status: She is alert and oriented to person, place, and time. Mental status is at baseline.   Psychiatric:         Mood and Affect: Mood normal.         Behavior: Behavior normal.         Thought Content: Thought content normal.         Judgment: Judgment normal.         Result Review :                 Assessment and Plan    Diagnoses and all orders for this visit:    1. Fecal smearing (Primary)    2. Hemorrhoids, unspecified hemorrhoid type    Other orders  -     hydrocortisone (ANUSOL-HC) 25 MG suppository; Insert 1 suppository into the rectum 2 (Two) Times a Day.  Dispense: 14 suppository; Refill: 1    Steroid suppositories prescribed  Recommended daily fiber supplement  Advised patient to undergo a colonoscopy. Patient declined due to her age, she is aware of risk associated with deferring procedure.     Follow Up   Return in about 3 months (around 11/23/2024).  Patient was given instructions and counseling regarding her condition or for health " maintenance advice. Please see specific information pulled into the AVS if appropriate.

## 2024-09-17 ENCOUNTER — TRANSCRIBE ORDERS (OUTPATIENT)
Dept: ADMINISTRATIVE | Facility: HOSPITAL | Age: 74
End: 2024-09-17
Payer: MEDICARE

## 2024-09-17 DIAGNOSIS — Z12.31 BREAST CANCER SCREENING BY MAMMOGRAM: Primary | ICD-10-CM

## 2024-12-06 ENCOUNTER — OFFICE VISIT (OUTPATIENT)
Dept: GASTROENTEROLOGY | Facility: CLINIC | Age: 74
End: 2024-12-06
Payer: MEDICARE

## 2024-12-06 VITALS
HEIGHT: 63 IN | SYSTOLIC BLOOD PRESSURE: 134 MMHG | HEART RATE: 66 BPM | BODY MASS INDEX: 24.1 KG/M2 | OXYGEN SATURATION: 100 % | DIASTOLIC BLOOD PRESSURE: 84 MMHG | WEIGHT: 136 LBS

## 2024-12-06 DIAGNOSIS — K64.9 HEMORRHOIDS, UNSPECIFIED HEMORRHOID TYPE: Primary | ICD-10-CM

## 2024-12-06 NOTE — PROGRESS NOTES
"Chief Complaint  Follow-up (3 month follow up ) and Hemorrhoids (Mucus)    Anabel Cali is a 74 y.o. female who presents to [facility]STROENTEROLOG[facility]- [facility] for follow up.     History of present Illness  Established care 8/23/24 for  mucus in stool, fecal leakage, and blood in stool. She has been having mucus discharge from her rectum about 1 year ago. She can have more constipation, can skip a few days without a bowel movement. Eats high fiber diet. Heartburn controlled with Pepcid 20mg at night. Steroid suppositories prescribed. Patient declined colonoscopy.     Patient presents to the office for follow up. She has a bowel movement almost daily. Denies melena and hematochezia. She continues to have clear mucus discharge. Does have have hemorrhoids, no relief with steroid cream. Can cause irritation. Denies upper GI symptoms.     History reviewed. No pertinent past medical history.    Past Surgical History:   Procedure Laterality Date    COLONOSCOPY           Current Outpatient Medications:     ezetimibe (ZETIA) 10 MG tablet, , Disp: , Rfl:     famotidine (PEPCID) 20 MG tablet, , Disp: , Rfl:     LORazepam (ATIVAN) 0.5 MG tablet, , Disp: , Rfl:     metoprolol tartrate (LOPRESSOR) 25 MG tablet, , Disp: , Rfl:     pravastatin (PRAVACHOL) 20 MG tablet, , Disp: , Rfl:     hydrocortisone (ANUSOL-HC) 25 MG suppository, Insert 1 suppository into the rectum 2 (Two) Times a Day. (Patient not taking: Reported on 12/6/2024), Disp: 14 suppository, Rfl: 1     No Known Allergies    Family History   Problem Relation Age of Onset    Colon polyps Brother     Colon cancer Neg Hx         Social History     Social History Narrative    Not on file       Objective       Vital Signs:   /84 (BP Location: Left arm, Patient Position: Sitting, Cuff Size: Adult)   Pulse 66   Ht 160 cm (62.99\")   Wt 61.7 kg (136 lb)   SpO2 100%   BMI 24.10 kg/m²       Physical Exam  Constitutional:       Appearance: Normal " appearance. She is normal weight.   HENT:      Head: Normocephalic and atraumatic.      Nose: Nose normal.   Pulmonary:      Effort: Pulmonary effort is normal.   Skin:     General: Skin is warm and dry.   Neurological:      Mental Status: She is alert and oriented to person, place, and time. Mental status is at baseline.   Psychiatric:         Mood and Affect: Mood normal.         Behavior: Behavior normal.         Thought Content: Thought content normal.         Judgment: Judgment normal.         Result Review :                 Assessment and Plan    Diagnoses and all orders for this visit:    1. Hemorrhoids, unspecified hemorrhoid type (Primary)    Patient does not want to undergo any procedures such as colonoscopy, aware of risk associated  Discussed colorectal referral for removal of hemorrhoids, patient wishes to defer at this point. She will call if she changes her mind.   Discharge likely related to hemorrhoids and pelvic floor dysfunction. Discussed OTC products that can provide relief.     Follow Up   Return if symptoms worsen or fail to improve.  Patient was given instructions and counseling regarding her condition or for health maintenance advice. Please see specific information pulled into the AVS if appropriate.

## 2025-04-02 ENCOUNTER — HOSPITAL ENCOUNTER (OUTPATIENT)
Dept: MAMMOGRAPHY | Facility: HOSPITAL | Age: 75
Discharge: HOME | End: 2025-04-02
Admitting: NURSE PRACTITIONER
Payer: MEDICARE

## 2025-04-02 DIAGNOSIS — Z12.31 BREAST CANCER SCREENING BY MAMMOGRAM: ICD-10-CM

## 2025-04-02 PROCEDURE — 77063 BREAST TOMOSYNTHESIS BI: CPT

## 2025-04-02 PROCEDURE — 77067 SCR MAMMO BI INCL CAD: CPT
